# Patient Record
Sex: FEMALE | Race: BLACK OR AFRICAN AMERICAN | Employment: FULL TIME | URBAN - METROPOLITAN AREA
[De-identification: names, ages, dates, MRNs, and addresses within clinical notes are randomized per-mention and may not be internally consistent; named-entity substitution may affect disease eponyms.]

---

## 2019-11-13 ENCOUNTER — OFFICE VISIT (OUTPATIENT)
Dept: PRIMARY CARE CLINIC | Age: 29
End: 2019-11-13

## 2019-11-13 VITALS
OXYGEN SATURATION: 98 % | DIASTOLIC BLOOD PRESSURE: 61 MMHG | WEIGHT: 128 LBS | BODY MASS INDEX: 22.68 KG/M2 | HEART RATE: 70 BPM | SYSTOLIC BLOOD PRESSURE: 100 MMHG | RESPIRATION RATE: 18 BRPM | TEMPERATURE: 98.1 F | HEIGHT: 63 IN

## 2019-11-13 DIAGNOSIS — R53.83 FATIGUE, UNSPECIFIED TYPE: ICD-10-CM

## 2019-11-13 DIAGNOSIS — R59.0 LYMPHADENOPATHY, CERVICAL: Primary | ICD-10-CM

## 2019-11-13 DIAGNOSIS — L60.9 ABNORMALITY OF NAIL SURFACE: ICD-10-CM

## 2019-11-13 PROBLEM — F17.200 SMOKER: Status: ACTIVE | Noted: 2019-11-13

## 2019-11-13 NOTE — PROGRESS NOTES
Subjective:     Chief Complaint   Patient presents with    [facility]    Nail Problem     toenails getting dark    Fatigue     very tired all the time and when she does sleeps, do not sleep long    Tingling     tips of fingers x about 1 yr        She  is a 26y.o. year old female who presents as a new patient to [facility]. Reports that its been years she has seen any doctor. Patient reports that her nail been always dark but for the past few years its been more darker and feels very cold. Feet and hands always feels cold. Feels fatigue. Winter times her finger looks bluish and feels tingly. Feels slight better with warm temp. Reports that she has been noticing painful lumps in her neck on and off. Comes and goes. Denies any weight loss, chills, cough. She smokes marijuana daily to help with with sleep and her anxiety. She smokes about 10 cig/day. A comprehensive review of systems was negative except for that written in the HPI. Objective:   at  Vitals:    11/13/19 1125   BP: 100/61   Pulse: 70   Resp: 18   Temp: 98.1 °F (36.7 °C)   TempSrc: Oral   SpO2: 98%   Weight: 128 lb (58.1 kg)   Height: 5' 2.5\" (1.588 m)       Physical Examination: General appearance - alert, well appearing, and in no distress, oriented to person, place, and time and normal appearing weight  Mental status - alert, oriented to person, place, and time, normal mood, behavior, speech, dress, motor activity, and thought processes  Ears - bilateral TM's and external ear canals normal  Nose - normal and patent, no erythema, discharge or polyps  Mouth - mucous membranes moist, pharynx normal without lesions. Lips looks dark as well. Neck - thyroid exam: thyroid is normal in size without nodules or tenderness, few < 1cm posterior cervical lymph node noted in both side.   Lymphatics - no palpable lymphadenopathy otherwise noted in neck, no hepatosplenomegaly  Chest - clear to auscultation, no wheezes, rales or rhonchi, symmetric air entry  Heart - normal rate, regular rhythm, normal S1, S2, no murmurs, rubs, clicks or gallops  Abdomen - soft, nontender, nondistended, no masses or organomegaly  Neurological - alert, oriented, normal speech, no focal findings or movement disorder noted  Musculoskeletal - no joint tenderness, deformity or swelling  Extremities - peripheral pulses normal, no pedal edema, no clubbing or cyanosis. Finger nails are dark colored. Both feet toenails are very dark as well as surrounding the nail areas. No Known Allergies   Social History     Socioeconomic History    Marital status: SINGLE     Spouse name: Not on file    Number of children: Not on file    Years of education: Not on file    Highest education level: Not on file   Tobacco Use    Smoking status: Current Every Day Smoker     Packs/day: 0.50    Smokeless tobacco: Never Used   Substance and Sexual Activity    Alcohol use: No    Drug use: Yes     Types: Marijuana    Sexual activity: Never      No family history on file. Past Surgical History:   Procedure Laterality Date    HX OTHER SURGICAL      polinidal cyst removed    HX WISDOM TEETH EXTRACTION        No past medical history on file. Assessment/ Plan:   Diagnoses and all orders for this visit:    1. Lymphadenopathy, cervical  -     CT NECK SOFT TISSUE W CONT; Future               On and off lymphadenopathy for few years. No signs of active infection. Will check chest xray, STI , CMP, SLE, Rh in next visit. 2. Fatigue, unspecified type  -     THYROID CASCADE PROFILE  -     CBC WITH AUTOMATED DIFF    3. Abnormality of nail surface  -    D/D is PVD, Reilly phenomenon   DUPLEX LOWER EXT ARTERY BILAT; Future  -     REFERRAL TO VASCULAR SURGERY       Strongly encouraged patient to quit smoking cigarettes and marijuana. Medication risks/benefits/costs/interactions/alternatives discussed with patient.   Advised patient to call back or return to office if symptoms worsen/change/persist. If patient cannot reach us or should anything more severe/urgent arise he/she should proceed directly to the nearest emergency department. Discussed expected course/resolution/complications of diagnosis in detail with patient. Patient given a written after visit summary which includes her diagnoses, current medications and vitals. Patient expressed understanding with the diagnosis and plan. Follow-up and Dispositions    · Return in about 1 week (around 11/20/2019), or if symptoms worsen or fail to improve, for complete physical  and fasting blood work, discuss the findings. Ciarra Brown

## 2019-11-14 LAB
BASOPHILS # BLD AUTO: 0.1 X10E3/UL (ref 0–0.2)
BASOPHILS NFR BLD AUTO: 1 %
EOSINOPHIL # BLD AUTO: 0.1 X10E3/UL (ref 0–0.4)
EOSINOPHIL NFR BLD AUTO: 1 %
ERYTHROCYTE [DISTWIDTH] IN BLOOD BY AUTOMATED COUNT: 12.5 % (ref 12.3–15.4)
HCT VFR BLD AUTO: 36.9 % (ref 34–46.6)
HGB BLD-MCNC: 12.4 G/DL (ref 11.1–15.9)
IMM GRANULOCYTES # BLD AUTO: 0 X10E3/UL (ref 0–0.1)
IMM GRANULOCYTES NFR BLD AUTO: 0 %
LYMPHOCYTES # BLD AUTO: 2.4 X10E3/UL (ref 0.7–3.1)
LYMPHOCYTES NFR BLD AUTO: 33 %
MCH RBC QN AUTO: 31.6 PG (ref 26.6–33)
MCHC RBC AUTO-ENTMCNC: 33.6 G/DL (ref 31.5–35.7)
MCV RBC AUTO: 94 FL (ref 79–97)
MONOCYTES # BLD AUTO: 0.5 X10E3/UL (ref 0.1–0.9)
MONOCYTES NFR BLD AUTO: 7 %
NEUTROPHILS # BLD AUTO: 4.4 X10E3/UL (ref 1.4–7)
NEUTROPHILS NFR BLD AUTO: 58 %
PLATELET # BLD AUTO: 277 X10E3/UL (ref 150–450)
RBC # BLD AUTO: 3.93 X10E6/UL (ref 3.77–5.28)
TSH SERPL DL<=0.005 MIU/L-ACNC: 0.73 UIU/ML (ref 0.45–4.5)
WBC # BLD AUTO: 7.5 X10E3/UL (ref 3.4–10.8)

## 2019-11-25 ENCOUNTER — OFFICE VISIT (OUTPATIENT)
Dept: PRIMARY CARE CLINIC | Age: 29
End: 2019-11-25

## 2019-11-25 ENCOUNTER — HOSPITAL ENCOUNTER (OUTPATIENT)
Dept: VASCULAR SURGERY | Age: 29
Discharge: HOME | End: 2019-11-25
Attending: FAMILY MEDICINE
Payer: MEDICAID

## 2019-11-25 ENCOUNTER — HOSPITAL ENCOUNTER (OUTPATIENT)
Dept: CT IMAGING | Age: 29
Discharge: HOME | End: 2019-11-25
Attending: FAMILY MEDICINE
Payer: MEDICAID

## 2019-11-25 VITALS
DIASTOLIC BLOOD PRESSURE: 54 MMHG | BODY MASS INDEX: 23.53 KG/M2 | HEART RATE: 81 BPM | RESPIRATION RATE: 17 BRPM | TEMPERATURE: 98.5 F | HEIGHT: 63 IN | SYSTOLIC BLOOD PRESSURE: 92 MMHG | OXYGEN SATURATION: 99 % | WEIGHT: 132.8 LBS

## 2019-11-25 DIAGNOSIS — R59.0 LYMPHADENOPATHY, CERVICAL: ICD-10-CM

## 2019-11-25 DIAGNOSIS — R59.0 LYMPHADENOPATHY, CERVICAL: Primary | ICD-10-CM

## 2019-11-25 DIAGNOSIS — L60.9 ABNORMALITY OF NAIL SURFACE: ICD-10-CM

## 2019-11-25 DIAGNOSIS — I73.00 RAYNAUD'S PHENOMENON WITHOUT GANGRENE: ICD-10-CM

## 2019-11-25 DIAGNOSIS — Z30.09 BIRTH CONTROL COUNSELING: ICD-10-CM

## 2019-11-25 DIAGNOSIS — R53.83 FATIGUE, UNSPECIFIED TYPE: ICD-10-CM

## 2019-11-25 LAB
LEFT ABI: 1.28
LEFT ANTERIOR TIBIAL: 109 MMHG
LEFT ARM BP: 93 MMHG
LEFT POSTERIOR TIBIAL: 119 MMHG
LEFT TBI: 1.02
LEFT TOE PRESSURE: 95 MMHG
RIGHT ABI: 1.39
RIGHT ANTERIOR TIBIAL: 114 MMHG
RIGHT ARM BP: 88 MMHG
RIGHT POSTERIOR TIBIAL: 129 MMHG
RIGHT TBI: 1.18
RIGHT TOE PRESSURE: 110 MMHG

## 2019-11-25 PROCEDURE — 74011636320 HC RX REV CODE- 636/320: Performed by: RADIOLOGY

## 2019-11-25 PROCEDURE — 70491 CT SOFT TISSUE NECK W/DYE: CPT

## 2019-11-25 PROCEDURE — 93922 UPR/L XTREMITY ART 2 LEVELS: CPT

## 2019-11-25 RX ADMIN — IOPAMIDOL 100 ML: 612 INJECTION, SOLUTION INTRAVENOUS at 09:04

## 2019-11-25 NOTE — PROGRESS NOTES
Jag Bennett is a 26 y.o. female    Chief Complaint   Patient presents with    Follow-up     Since last visit, went to the vascular surgeon who ordered CT and nakle brachial index       1. Have you been to the ER, urgent care clinic since your last visit? Hospitalized since your last visit? No    2. Have you seen or consulted any other health care providers outside of the [address] [place] [address] since your last visit? Include any pap smears or colon screening. No    No flowsheet data found.      Health Maintenance Due   Topic Date Due    Pneumococcal 0-64 years (1 of 1 - PPSV23) 05/23/1995    DTaP/Tdap/Td series (1 - Tdap) 05/23/2000

## 2019-11-25 NOTE — PROGRESS NOTES
Written by Lazaro Ibanez, as dictated by Dr. Frantz Stringer MD.    Slava Mendez is a 26 y.o. female. HPI  The patient presents today for follow-up. She is feeling better today as compared to her previous visit, though she is fatigued due to working over the weekend. She reports joint pain, especially in her hands. She does sweat at night and feels hot at night, but admits she also uses 2 blankets at night as her room is cold. Ankle Brachial Index showed \"No evidence of significant peripheral arterial disease at rest in the right leg. No evidence of significant peripheral arterial disease at rest in the left leg. The right ankle/brachial index is 1.39 and the left ankle/brachial index is 1.28. The right toe/brachial index is 1.18 and the left toe/brachial index is 1.02.\" She is wearing thicker socks to keep her feet warm. She saw the Vascular Surgeon on 11/18/19, and was told it might be due to KIARA Yen. She had her CT NECK this morning. She reports no issues with neck at this time. Her last pap smear was done in 2017. She would like a referral for an OB/GYN as she would like to start a birth control that works for her. Patient Active Problem List   Diagnosis Code    Smoker F17.200        No current outpatient medications on file prior to visit. No current facility-administered medications on file prior to visit. No Known Allergies    History reviewed. No pertinent past medical history.     Past Surgical History:   Procedure Laterality Date    HX OTHER SURGICAL      polinidal cyst removed    HX WISDOM TEETH EXTRACTION         Family History   Problem Relation Age of Onset    No Known Problems Father     No Known Problems Mother        Social History     Socioeconomic History    Marital status: SINGLE     Spouse name: Not on file    Number of children: Not on file    Years of education: Not on file    Highest education level: Not on file Occupational History    Not on file   Social Needs    Financial resource strain: Not on file    Food insecurity:     Worry: Not on file     Inability: Not on file    Transportation needs:     Medical: Not on file     Non-medical: Not on file   Tobacco Use    Smoking status: Current Every Day Smoker     Packs/day: 0.50    Smokeless tobacco: Never Used   Substance and Sexual Activity    Alcohol use: No    Drug use: Yes     Types: Marijuana    Sexual activity: Yes     Partners: Male     Birth control/protection: Condom   Lifestyle    Physical activity:     Days per week: Not on file     Minutes per session: Not on file    Stress: Not on file   Relationships    Social connections:     Talks on phone: Not on file     Gets together: Not on file     Attends [employer] service: Not on file     Active member of club or organization: Not on file     Attends meetings of clubs or organizations: Not on file     Relationship status: Not on file    Intimate partner violence:     Fear of current or ex partner: Not on file     Emotionally abused: Not on file     Physically abused: Not on file     Forced sexual activity: Not on file   Other Topics Concern    Not on file   Social History Narrative    Not on file       Hospital Outpatient Visit on 11/25/2019   Component Date Value Ref Range Status    Left arm BP 11/25/2019 93  mmHg Final    Right arm BP 11/25/2019 88  mmHg Final    Left posterior tibial 11/25/2019 119  mmHg Final    Right posterior tibial 11/25/2019 129  mmHg Final    Left anterior tibial 11/25/2019 109  mmHg Final    Right anterior tibial 11/25/2019 114  mmHg Final    Left DIMAS 11/25/2019 1.28   Final    Right DIMAS 11/25/2019 1.39   Final    Left toe pressure 11/25/2019 95  mmHg Final    Right toe pressure 11/25/2019 110  mmHg Final    Left TBI 11/25/2019 1.02   Final    Right TBI 11/25/2019 1.18   Final   Office Visit on 11/13/2019   Component Date Value Ref Range Status    TSH 11/13/2019 0.735  0.450 - 4.500 uIU/mL Final    WBC 11/13/2019 7.5  3.4 - 10.8 x10E3/uL Final    RBC 11/13/2019 3.93  3.77 - 5.28 x10E6/uL Final    HGB 11/13/2019 12.4  11.1 - 15.9 g/dL Final    HCT 11/13/2019 36.9  34.0 - 46.6 % Final    MCV 11/13/2019 94  79 - 97 fL Final    MCH 11/13/2019 31.6  26.6 - 33.0 pg Final    MCHC 11/13/2019 33.6  31.5 - 35.7 g/dL Final    RDW 11/13/2019 12.5  12.3 - 15.4 % Final    PLATELET 14/22/2227 493  150 - 450 x10E3/uL Final    NEUTROPHILS 11/13/2019 58  Not Estab. % Final    Lymphocytes 11/13/2019 33  Not Estab. % Final    MONOCYTES 11/13/2019 7  Not Estab. % Final    EOSINOPHILS 11/13/2019 1  Not Estab. % Final    BASOPHILS 11/13/2019 1  Not Estab. % Final    ABS. NEUTROPHILS 11/13/2019 4.4  1.4 - 7.0 x10E3/uL Final    Abs Lymphocytes 11/13/2019 2.4  0.7 - 3.1 x10E3/uL Final    ABS. MONOCYTES 11/13/2019 0.5  0.1 - 0.9 x10E3/uL Final    ABS. EOSINOPHILS 11/13/2019 0.1  0.0 - 0.4 x10E3/uL Final    ABS. BASOPHILS 11/13/2019 0.1  0.0 - 0.2 x10E3/uL Final    IMMATURE GRANULOCYTES 11/13/2019 0  Not Estab. % Final    ABS. IMM. GRANS. 11/13/2019 0.0  0.0 - 0.1 x10E3/uL Final       Review of Systems   Constitutional: Negative for malaise/fatigue and weight loss. HENT: Negative for congestion and hearing loss. Eyes: Negative for blurred vision and photophobia. Respiratory: Negative for cough and shortness of breath. Cardiovascular: Negative for chest pain and leg swelling. Gastrointestinal: Negative for constipation, diarrhea and heartburn. Genitourinary: Negative for dysuria, frequency and urgency. Musculoskeletal: Negative for joint pain and myalgias. Neurological: Negative for dizziness and headaches. Psychiatric/Behavioral: Negative for depression and substance abuse. The patient is not nervous/anxious and does not have insomnia.       Visit Vitals  BP 92/54 (BP 1 Location: Left arm, BP Patient Position: Sitting)   Pulse 81   Temp 98.5 °F (36.9 °C) (Oral) Resp 17   Ht 5' 2.5\" (1.588 m)   Wt 132 lb 12.8 oz (60.2 kg)   LMP 11/01/2019   SpO2 99%   BMI 23.90 kg/m²       Physical Exam  Vitals signs and nursing note reviewed. Constitutional:       General: She is not in acute distress. Appearance: She is well-developed. She is not diaphoretic. HENT:      Head: Normocephalic and atraumatic. Right Ear: External ear normal.      Left Ear: External ear normal.   Eyes:      General:         Right eye: No discharge. Left eye: No discharge. Conjunctiva/sclera: Conjunctivae normal.      Pupils: Pupils are equal, round, and reactive to light. Neck:      Musculoskeletal: Normal range of motion and neck supple. Cardiovascular:      Rate and Rhythm: Normal rate and regular rhythm. Heart sounds: Normal heart sounds. No murmur. No friction rub. No gallop. Pulmonary:      Effort: Pulmonary effort is normal.      Breath sounds: Normal breath sounds. No wheezing. Abdominal:      General: Bowel sounds are normal.      Palpations: Abdomen is soft. Tenderness: There is no tenderness. Musculoskeletal: Normal range of motion. Right hand: She exhibits no swelling. Left hand: She exhibits no swelling. Lymphadenopathy:      Cervical: Cervical adenopathy present. Right cervical: Posterior cervical adenopathy (2-3 mm, soft) present. Left cervical: Posterior cervical adenopathy (1-2 mm, soft) present. Skin:     Capillary Refill: Capillary refill takes less than 2 seconds. Comments: + BL fingernails dark in color   Neurological:      Mental Status: She is alert and oriented to person, place, and time. Deep Tendon Reflexes: Reflexes are normal and symmetric. Psychiatric:         Behavior: Behavior normal.         Thought Content: Thought content normal.         ASSESSMENT and PLAN  Diagnoses and all orders for this visit:    1.  Lymphadenopathy, cervical  -     METABOLIC PANEL, COMPREHENSIVE  -     LUPUS PROFILE  - RHEUMATOID FACTOR, QL  -     C REACTIVE PROTEIN, QT  -     SED RATE (ESR)              CT neck came back normal.   2. Fatigue, unspecified type  -     METABOLIC PANEL, COMPREHENSIVE  -     LUPUS PROFILE  -     RHEUMATOID FACTOR, QL  -     C REACTIVE PROTEIN, QT  -     SED RATE (ESR)    3. Birth control counseling  -     REFERRAL TO OBSTETRICS AND GYNECOLOGY    4. Raynaud's phenomenon without gangrene       - DIMAS is normal.       - seen by vascular specialist and she was told that she has Raynauds. -   continue current management. -  Will consider sending her to rheumatologist.          Discussed pt should return for a physical examination. This plan was reviewed with the patient and patient agrees. All questions were answered. This scribe documentation was reviewed by me and accurately reflects the examination and decisions made by me. This note will not be viewable in [address] Follow-up and Dispositions    · Return if symptoms worsen or fail to improve, for complete physical  and fasting blood work. Madan Don

## 2019-11-26 ENCOUNTER — TELEPHONE (OUTPATIENT)
Dept: PRIMARY CARE CLINIC | Age: 29
End: 2019-11-26

## 2019-11-26 LAB
ALBUMIN SERPL-MCNC: 4.5 G/DL (ref 3.5–5.5)
ALBUMIN/GLOB SERPL: 2 {RATIO} (ref 1.2–2.2)
ALP SERPL-CCNC: 38 IU/L (ref 39–117)
ALT SERPL-CCNC: 8 IU/L (ref 0–32)
ANA SER QL: NEGATIVE
AST SERPL-CCNC: 11 IU/L (ref 0–40)
BILIRUB SERPL-MCNC: 0.5 MG/DL (ref 0–1.2)
BUN SERPL-MCNC: 14 MG/DL (ref 6–20)
BUN/CREAT SERPL: 21 (ref 9–23)
CALCIUM SERPL-MCNC: 8.7 MG/DL (ref 8.7–10.2)
CHLORIDE SERPL-SCNC: 103 MMOL/L (ref 96–106)
CO2 SERPL-SCNC: 23 MMOL/L (ref 20–29)
CREAT SERPL-MCNC: 0.67 MG/DL (ref 0.57–1)
CRP SERPL-MCNC: 1 MG/L (ref 0–10)
DSDNA AB SER-ACNC: 1 IU/ML (ref 0–9)
ENA RNP AB SER-ACNC: <0.2 AI (ref 0–0.9)
ENA SM AB SER-ACNC: <0.2 AI (ref 0–0.9)
ERYTHROCYTE [SEDIMENTATION RATE] IN BLOOD BY WESTERGREN METHOD: 5 MM/HR (ref 0–32)
GLOBULIN SER CALC-MCNC: 2.2 G/DL (ref 1.5–4.5)
GLUCOSE SERPL-MCNC: 81 MG/DL (ref 65–99)
POTASSIUM SERPL-SCNC: 4.1 MMOL/L (ref 3.5–5.2)
PROT SERPL-MCNC: 6.7 G/DL (ref 6–8.5)
RHEUMATOID FACT SERPL-ACNC: <10 IU/ML (ref 0–13.9)
SODIUM SERPL-SCNC: 139 MMOL/L (ref 134–144)

## 2019-11-26 NOTE — TELEPHONE ENCOUNTER
----- Message from Vanesa Villanueva MD sent at 11/26/2019  9:10 AM EST -----  Please call patient; There are scattered normal size lymph nodes in her  neck in the deep subcutaneous tissue. No acute finding.   Thyroid is normal.  Over all CT looks normal.

## 2019-11-26 NOTE — PROGRESS NOTES
Please call patient; There are scattered normal size lymph nodes in her  neck in the deep subcutaneous tissue. No acute finding.   Thyroid is normal.  Over all CT looks normal.

## 2019-11-26 NOTE — PROGRESS NOTES
Please mail letter;    1. Lupus, rheumatoid level is normal.     2. Inflammatory markers are in normal range.      3. Kidney and liver function is normal.

## 2019-12-05 ENCOUNTER — TELEPHONE (OUTPATIENT)
Dept: PRIMARY CARE CLINIC | Age: 29
End: 2019-12-05

## 2019-12-05 NOTE — TELEPHONE ENCOUNTER
----- Message from Carlos Enrique Moura sent at 12/4/2019  4:13 PM EST -----  Regarding: Dr. Farah Farwell: [phone]  Pt requesting a return call regarding lab test results from 11/25/19.

## 2019-12-05 NOTE — TELEPHONE ENCOUNTER
Informed pt that labs were normal:  1. Lupus, rheumatoid level is normal.     2. Inflammatory markers are in normal range. 3. Kidney and liver function is normal. Pt verbalized her understanding. Result letter mailed 11/26/19.

## 2019-12-17 ENCOUNTER — OFFICE VISIT (OUTPATIENT)
Dept: OBGYN CLINIC | Age: 29
End: 2019-12-17

## 2019-12-17 VITALS
BODY MASS INDEX: 23.74 KG/M2 | WEIGHT: 134 LBS | DIASTOLIC BLOOD PRESSURE: 72 MMHG | SYSTOLIC BLOOD PRESSURE: 114 MMHG | HEIGHT: 63 IN

## 2019-12-17 DIAGNOSIS — N94.6 DYSMENORRHEA: Primary | ICD-10-CM

## 2019-12-17 DIAGNOSIS — Z87.42 HISTORY OF HEAVY PERIODS: ICD-10-CM

## 2019-12-17 NOTE — PROGRESS NOTES
Gyn evaluation/followup    The patient is a 26 y.o., No obstetric history on file. Patient's last menstrual period was 11/28/2019 (exact date). .     She is here for contraceptive counseling/folllow up. Her current method of family planning is condoms always. The patient is sexually active. She states she is not satisfied with her current form of contraception because: effectivness   These concerns are mild    Contraceptive History: has used other contraception in the past: OCP's, depo, and nuvaring. She states she did not do well on any of those. Due to weight gain and she does not like how they make her feel. Her current menstrual difficulty history: heavy cycles with significant dysmenorrhea. The dysmenorrhea is sometimes alleviated by non-steroidal anti-inflammatory medication. This pain begins at the onset of menses and lasts for several days. Her previous menses she describes as moderate lasting up to 6 days. History reviewed. No pertinent past medical history.      Past Surgical History:   Procedure Laterality Date    HX OTHER SURGICAL      polinidal cyst removed    HX WISDOM TEETH EXTRACTION       Social History     Occupational History    Not on file   Tobacco Use    Smoking status: Current Every Day Smoker     Packs/day: 0.50    Smokeless tobacco: Never Used   Substance and Sexual Activity    Alcohol use: No    Drug use: Yes     Types: Marijuana    Sexual activity: Yes     Partners: Male     Birth control/protection: Condom     Family History   Problem Relation Age of Onset    No Known Problems Father     No Known Problems Mother        No Known Allergies  Prior to Admission medications    Not on File        Review of Systems - History obtained from the patient  Constitutional: negative for weight loss, fever, night sweats  HEENT: negative for hearing loss, earache, congestion, snoring, sorethroat  CV: negative for chest pain, palpitations, edema  Resp: negative for cough, shortness of breath, wheezing  Breast: negative for breast lumps, nipple discharge, galactorrhea  GI: negative for change in bowel habits, abdominal pain, black or bloody stools  : negative for frequency, dysuria, hematuria  MSK: negative for back pain, joint pain, muscle pain  Skin: negative for itching, rash, hives  Neuro: negative for dizziness, headache, confusion, weakness  Psych: negative for anxiety, depression, change in mood  Heme/lymph: negative for bleeding, bruising, pallor      Objective:    Visit Vitals  /72   Ht 5' 2.5\" (1.588 m)   Wt 134 lb (60.8 kg)   LMP 11/28/2019 (Exact Date)   BMI 24.12 kg/m²          PHYSICAL EXAMINATION    Constitutional  · Appearance: well-nourished, well developed, alert, in no acute distress    HENT  · Head and Face: appears normal    Neck  · Inspection/Palpation: normal appearance, no masses or tenderness  · Lymph Nodes: no lymphadenopathy present  · Thyroid: gland size normal, nontender, no nodules or masses present on palpation    Gastrointestinal  · Abdominal Examination: abdomen non-tender to palpation, normal bowel sounds, no masses present  · Liver and spleen: no hepatomegaly present, spleen not palpable  · Hernias: no hernias identified    Skin  · General Inspection: no rash, no lesions identified    Neurologic/Psychiatric  · Mental Status:  · Orientation: grossly oriented to person, place and time  · Mood and Affect: mood normal, affect appropriate    Assessment/Plan:   Heavy cycles and dysmenorrhea, has tried multiple other contraceptives- will rto for IUD placment      Instructions given to pt. Handouts given to pt.

## 2019-12-31 ENCOUNTER — OFFICE VISIT (OUTPATIENT)
Dept: OBGYN CLINIC | Age: 29
End: 2019-12-31

## 2019-12-31 VITALS — BODY MASS INDEX: 23.94 KG/M2 | SYSTOLIC BLOOD PRESSURE: 110 MMHG | DIASTOLIC BLOOD PRESSURE: 60 MMHG | WEIGHT: 133 LBS

## 2019-12-31 DIAGNOSIS — N94.89 SUPPRESSION OF MENSES: ICD-10-CM

## 2019-12-31 DIAGNOSIS — Z87.42 HISTORY OF HEAVY PERIODS: ICD-10-CM

## 2019-12-31 DIAGNOSIS — N94.6 DYSMENORRHEA: ICD-10-CM

## 2019-12-31 DIAGNOSIS — Z30.430 ENCOUNTER FOR IUD INSERTION: Primary | ICD-10-CM

## 2019-12-31 LAB
HCG URINE, QL. (POC): NEGATIVE
VALID INTERNAL CONTROL?: YES

## 2019-12-31 NOTE — PROGRESS NOTES
[facility]  OFFICE PROCEDURE PROGRESS NOTE        Chart reviewed for the following:   ICali RN, have reviewed the History, Physical and updated the Allergic reactions for Janes Willett performed immediately prior to start of procedure:   Gardenia Sow RN, have performed the following reviews on Fabrice Corbin prior to the start of the procedure:            * Patient was identified by name and date of birth   * Agreement on procedure being performed was verified  * Risks and Benefits explained to the patient  * Procedure site verified and marked as necessary  * Patient was positioned for comfort  * Consent was signed and verified     Time: 09      Date of procedure: 2019    Procedure performed by: Nadia Jamison MD    Provider assisted by: Lisa Kirkland RN    Patient assisted by: self    How tolerated by patient: tolerated the procedure well with no complications    Post Procedural Pain Scale: 2 - Hurts Little Bit    Comments: none                  -----------------------------------IUD INSERTION----------------------------------------- Indications:  Fabrice Corbin is a ,  26 y.o. female [address] whose No LMP recorded. was on  and was normal in duration and amount of flow. who presents for insertion of an IUD. The risks, benefits and alternatives of IUD insertion were discussed in detail at last visit. She also has reviewed Mirena information. She has elected to proceed with the insertion today and she states she has no further questions. A urine pregnancy test was negative No components found for: SPEP, UPEP    Procedure: The pelvic exam revealed normal external genitalia. On bimanual exam the uterus was anteverted and normal in size with no tenderness present. A speculum was inserted into the vagina and the cervix was visualized. The cervix was prepped with zephiran solution.  The posterior lip of the cervix was grasped with a single toothed tenaculum. The uterus was sounded with a Samaniego sound to 7 centimeters. A Mirena was then inserted without difficulty. The string was cut to 3 centimeters. She experienced a mild  amount of cramping. Post Procedure Status: She tolerated the procedure with mild discomfort. The patient was observed for 10 minutes after the insertion. There were no complications. Patient was discharged in stable condition. The patient received Mirena lot number [id number].

## 2020-01-29 ENCOUNTER — TELEPHONE (OUTPATIENT)
Dept: OBGYN CLINIC | Age: 30
End: 2020-01-29

## 2020-01-29 NOTE — TELEPHONE ENCOUNTER
Dr. Alessandra Dowell did a IUD placement for you while you were out for someone with history of heavy cycles. She had a Mirena placed on 12/31/19. She is scheduled for IUD check WITHOUT  Ultrasound on 2/11/20 with you. She states that she was told to expect some spotting for several weeks. She said that she did have spotting and then it worsened over the past 3 weeks. Had pelvic/hip pain last week that was making it hard to walk, that has since improved. She is now bleeding heavier x 3 weeks. She is changing an overnight pad twice daily with bright red blood and small clots size of erasers. No cramping. Fatigued. I know bleeding can be expected for up to 6 months. She and I discussed ibuprofen protocol 800 mg q8hrs for 72 hours to help with inflammation, pain, and bleeding. Heating pad on and off. Discussed bleeding and pain precautions.     Anything else that you would like to add?

## 2020-01-29 NOTE — TELEPHONE ENCOUNTER
The day she was scheduled for FW does not have US. Rescheduled for 2/4/20 at 930 am for ultrasound and visit with FW (with wait-pt aware) at 1050. Arrive for ultrasound at 9:15 am.  Patient is good with this date and time.

## 2020-11-23 ENCOUNTER — HOSPITAL ENCOUNTER (EMERGENCY)
Age: 30
Discharge: HOME | End: 2020-11-23
Attending: EMERGENCY MEDICINE
Payer: COMMERCIAL

## 2020-11-23 VITALS
SYSTOLIC BLOOD PRESSURE: 123 MMHG | OXYGEN SATURATION: 99 % | HEART RATE: 67 BPM | BODY MASS INDEX: 23 KG/M2 | DIASTOLIC BLOOD PRESSURE: 74 MMHG | TEMPERATURE: 98.9 F | HEIGHT: 62 IN | RESPIRATION RATE: 16 BRPM | WEIGHT: 125 LBS

## 2020-11-23 DIAGNOSIS — R09.89 GLOBUS SENSATION: Primary | ICD-10-CM

## 2020-11-23 PROCEDURE — 99284 EMERGENCY DEPT VISIT MOD MDM: CPT

## 2020-11-23 PROCEDURE — 74011250637 HC RX REV CODE- 250/637: Performed by: EMERGENCY MEDICINE

## 2020-11-23 PROCEDURE — 74011000250 HC RX REV CODE- 250: Performed by: EMERGENCY MEDICINE

## 2020-11-23 RX ORDER — MAG HYDROX/ALUMINUM HYD/SIMETH 200-200-20
30 SUSPENSION, ORAL (FINAL DOSE FORM) ORAL
Status: DISCONTINUED | OUTPATIENT
Start: 2020-11-23 | End: 2020-11-23 | Stop reason: HOSPADM

## 2020-11-23 RX ORDER — LIDOCAINE HYDROCHLORIDE 20 MG/ML
15 SOLUTION OROPHARYNGEAL
Status: COMPLETED | OUTPATIENT
Start: 2020-11-23 | End: 2020-11-23

## 2020-11-23 RX ADMIN — ALUMINUM HYDROXIDE, MAGNESIUM HYDROXIDE, AND SIMETHICONE 30 ML: 200; 200; 20 SUSPENSION ORAL at 04:07

## 2020-11-23 RX ADMIN — LIDOCAINE HYDROCHLORIDE 15 ML: 20 SOLUTION ORAL; TOPICAL at 04:07

## 2020-11-23 NOTE — ED PROVIDER NOTES
EMERGENCY DEPARTMENT HISTORY AND PHYSICAL EXAM      Date: 11/23/2020  Patient Name: Conley Sandhoff    History of Presenting Illness     Chief Complaint   Patient presents with    Foreign Body Swallowed       History Provided By: Patient    HPI: Conley Sandhoff, 31 y.o. female with a past medical history significant No significant past medical history presents to the ED with cc of stuck in her throat. Patient was taking a pain pill for a dental infection and states she burped and solid swallowed and felt as if the pill got stuck in her throat. It does feel like it is moved up a little further down. She has improving but still feels a \"odd\" sensation in her throat. No shortness of breath no chest pain no cough. No difficulty swallowing. She is tolerating p.o. in the ED. There are no other complaints, changes, or physical findings at this time. PCP: Ankush Hubbard MD    No current facility-administered medications on file prior to encounter. No current outpatient medications on file prior to encounter. Past History     Past Medical History:  Past Medical History:   Diagnosis Date    Encounter for insertion of mirena IUD 12/31/2019       Past Surgical History:  Past Surgical History:   Procedure Laterality Date    HX OTHER SURGICAL      polinidal cyst removed    HX WISDOM TEETH EXTRACTION         Family History:  Family History   Problem Relation Age of Onset    No Known Problems Father     No Known Problems Mother        Social History:  Social History     Tobacco Use    Smoking status: Current Every Day Smoker     Packs/day: 0.50    Smokeless tobacco: Never Used   Substance Use Topics    Alcohol use: No    Drug use: Yes     Types: Marijuana       Allergies:  No Known Allergies      Review of Systems     Review of Systems   Constitutional: Negative for activity change and appetite change. HENT: Negative for drooling, mouth sores, sore throat, trouble swallowing and voice change. Respiratory: Negative for chest tightness and shortness of breath. Cardiovascular: Negative for chest pain and palpitations. Gastrointestinal: Negative for abdominal pain and nausea. Physical Exam     Physical Exam  Vitals signs and nursing note reviewed. Constitutional:       Appearance: Normal appearance. She is normal weight. HENT:      Head: Normocephalic. Right Ear: External ear normal.      Left Ear: External ear normal.      Nose: Nose normal.      Mouth/Throat:      Mouth: Mucous membranes are moist.      Pharynx: Oropharynx is clear. Neck:      Musculoskeletal: Normal range of motion. No neck rigidity. Comments: No crepitance  Neurological:      Mental Status: She is alert. Medical Decision Making   - I am the first provider for this patient. - I reviewed the vital signs, available nursing notes, past medical history, past surgical history, family history and social history. - Initial assessment performed. The patients presenting problems have been discussed, and they are in agreement with the care plan formulated and outlined with them. I have encouraged them to ask questions as they arise throughout their visit. Vital Signs-Reviewed the patient's vital signs. Patient Vitals for the past 12 hrs:   Temp Pulse Resp BP SpO2   11/23/20 0518 -- 67 16 123/74 99 %   11/23/20 0436 -- 64 17 115/73 99 %   11/23/20 0343 -- 88 16 104/72 99 %   11/23/20 0333 -- -- -- -- 99 %   11/23/20 0325 98.9 °F (37.2 °C) 77 18 118/80 99 %       Records Reviewed: Nursing Notes    ED Course:          Provider Notes (Medical Decision Making):   69-year-old female presenting with what is likely a globus sensation. She is tolerating p.o. she is drug can of [employer] she is given a GI cocktail. She reports symptoms have essentially resolved. Discharge home with expectant management. No concerns for esophageal rupture or impacted foreign body.   MDM           Disposition   Disposition: Discharged    DISCHARGE PLAN:  1. Cannot display discharge medications since this patient is not currently admitted. 2.   Follow-up Information     Follow up With Specialties Details Why [facility] EMERGENCY DEPT Emergency Medicine Go to  As needed, or for any concerns or deteriorations. , if symptoms persist or worsen. [address] [facility] [address] [phone]    Cinthya Blackwell MD Internal Medicine  As needed [facility]  [address]  [address] [facility]  [phone]          3. Return to ED if worse   4. There are no discharge medications for this patient. Diagnosis     Clinical Impression:   1. Globus sensation        Attestations:    Jn Berger MD    Please note that this dictation was completed with [employer], the computer voice recognition software. Quite often unanticipated grammatical, syntax, homophones, and other interpretive errors are inadvertently transcribed by the computer software. Please disregard these errors. Please excuse any errors that have escaped final proofreading. Thank you.

## 2020-11-23 NOTE — ED TRIAGE NOTES
Taking tylenol for pain and went to swallow pill and belched and states the pill got \"stuck\", coughed and vomited but never saw the pill. States she can still feel the pill when she swallows.

## 2021-04-15 NOTE — PROGRESS NOTES
Diagnosis: Chronic bilateral low back pain without sciatica (M54.5,G89.29)  Spondylosis of lumbosacral spine without myelopathy (M47.817)  Lumbar strain, subsequent encounter (Y51.328H        Next MD visit: none scheduled  Fall Risk: standard         Pre Result discussed in office today. independent with HEP, its progression, and management of residual symptoms. 2. Patient will report neck and back pain of not more than 1/10 during activity. 3. Increase cervical and lumbar spine ROM to [facility] into all planes to improve mobility.   4. Increas

## 2022-09-28 ENCOUNTER — HOSPITAL ENCOUNTER (EMERGENCY)
Age: 32
Discharge: HOME | End: 2022-09-28
Attending: EMERGENCY MEDICINE
Payer: MEDICAID

## 2022-09-28 VITALS
TEMPERATURE: 98.2 F | RESPIRATION RATE: 16 BRPM | HEART RATE: 75 BPM | OXYGEN SATURATION: 96 % | DIASTOLIC BLOOD PRESSURE: 72 MMHG | SYSTOLIC BLOOD PRESSURE: 113 MMHG

## 2022-09-28 DIAGNOSIS — A08.4 VIRAL GASTROENTERITIS: Primary | ICD-10-CM

## 2022-09-28 PROCEDURE — 99283 EMERGENCY DEPT VISIT LOW MDM: CPT

## 2022-09-28 RX ORDER — ONDANSETRON 4 MG/1
4 TABLET, ORALLY DISINTEGRATING ORAL
Qty: 7 TABLET | Refills: 0 | Status: SHIPPED | OUTPATIENT
Start: 2022-09-28 | End: 2022-09-28 | Stop reason: SDUPTHER

## 2022-09-28 RX ORDER — ONDANSETRON 4 MG/1
4 TABLET, ORALLY DISINTEGRATING ORAL
Qty: 7 TABLET | Refills: 0 | Status: SHIPPED | OUTPATIENT
Start: 2022-09-28

## 2022-09-28 NOTE — DISCHARGE INSTRUCTIONS
You were seen in the ER for your vomiting, diarrhea, and abdominal pain. This is likely from a virus infecting your stomach and intestines and irritating it causing vomiting and diarrhea. We call this a \"viral gastroenteritis\" or \"stomach bug/flu. \" Thankfully, you are not dehydrated and we were able to control your symptoms with an antinausea medication. This medication dissolves under your tongue so you can take it even if you are actively vomiting. Take tylenol or ibuprofen for aches, pains or fever for the next few days and follow up with your primary care doctor to make sure you are getting better. Return to the ER for any new pain, uncontrollable vomiting or diarrhea or any other new or concerning symptoms. Thank you! Thank you for allowing me to care for you in the emergency department. It is my goal to provide you with excellent care. If you have not received excellent quality care, please ask to speak to the nurse manager. Please fill out the survey that will come to you by mail or email since we listen to your feedback! Below you will find a list of your tests from today's visit. Should you have any questions, please do not hesitate to call the emergency department. Labs  No results found for this or any previous visit (from the past 12 hour(s)). Radiologic Studies  No orders to display     CT Results  (Last 48 hours)      None          CXR Results  (Last 48 hours)      None          ------------------------------------------------------------------------------------------------------------  The exam and treatment you received in the Emergency Department were for an urgent problem and are not intended as complete care. It is important that you follow-up with a doctor, nurse practitioner, or physician assistant to:  (1) confirm your diagnosis,  (2) re-evaluation of changes in your illness and treatment, and  (3) for ongoing care.  Please take your discharge instructions with you when you go to your follow-up appointment. If you have any problem arranging a follow-up appointment, contact the Emergency Department. If your symptoms become worse or you do not improve as expected and you are unable to reach your health care provider, please return to the Emergency Department. We are available 24 hours a day. If a prescription has been provided, please have it filled as soon as possible to prevent a delay in treatment. If you have any questions or reservations about taking the medication due to side effects or interactions with other medications, please call your primary care provider or contact the ER.

## 2022-09-28 NOTE — ED TRIAGE NOTES
Patient presents to the ED with abd pain, N/V/D and rectal bleeding when she wiped. Reports she has hemorrhoids but none right now.

## 2022-09-28 NOTE — ED PROVIDER NOTES
EMERGENCY DEPARTMENT HISTORY AND PHYSICAL EXAM      Date: 9/28/2022  Patient Name: Emily Hair      History of Presenting Illness     Chief Complaint   Patient presents with    Abdominal Pain    Diarrhea    Vomiting    Rectal Bleeding       History Provided By: Patient    HPI: Emily Hair, 34 y.o. female with a past medical history significant for Hemorrhoids presents to the ED with cc of abdominal pain, n/v/d. Endorses tenesmus since Sunday. Noticed small amount of smear of blood when she wiped on Sunday. Yesterday having frequent watery diarrhea, noticed some blood when wiping again. Having crampy lower abd pain. No F/C. +Sick contact in son with viral URI sx that began Sunday. Today w/1 episode NBNB emesis and continued diarrhea. Has cousin with Crohn's but no other relatives with autoimmune disease. No recent abx use or travel. There are no other complaints, changes, or physical findings at this time. PCP: Conner Jonas MD    Current Outpatient Medications   Medication Sig Dispense Refill    ondansetron (ZOFRAN ODT) 4 mg disintegrating tablet Take 1 Tablet by mouth every eight (8) hours as needed for Nausea or Vomiting. 7 Tablet 0       Past History     Past Medical History:  Past Medical History:   Diagnosis Date    Encounter for insertion of mirena IUD 12/31/2019       Past Surgical History:  Past Surgical History:   Procedure Laterality Date    HX OTHER SURGICAL      polinidal cyst removed    HX WISDOM TEETH EXTRACTION         Family History:  Family History   Problem Relation Age of Onset    No Known Problems Father     No Known Problems Mother        Social History:  Social History     Tobacco Use    Smoking status: Every Day     Packs/day: 0.50     Types: Cigarettes    Smokeless tobacco: Never   Substance Use Topics    Alcohol use: No    Drug use: Yes     Types: Marijuana       Allergies:  No Known Allergies      Review of Systems   Constitutional: Negative except as in HPI.   Eyes: Negative except as in HPI.  ENT: Negative except as in HPI. Cardiovascular: Negative except as in HPI. Respiratory: Negative except as in HPI. Gastrointestinal: Negative except as in HPI. Genitourinary: Negative except as in HPI. Musculoskeletal: Negative except as in HPI. Integumentary: Negative except as in HPI. Neurological: Negative except as in HPI. Psychiatric: Negative except as in HPI. Endocrine: Negative except as in HPI. Hematologic/Lymphatic: Negative except as in HPI. Allergic/Immunologic: Negative except as in HPI. Physical Exam   Constitutional: Awake and alert, interactive, NAD  Eyes: PERRL, no injection or scleral icterus, no discharge  HEENT: NCAT, neck supple, MMM, no oropharyngeal exudates  CV: RRR, no m/r/g  Respiratory: CTAB, no r/r/w  GI: Abd soft, nondistended, ttp throughout, no r/g  : Deferred  MSK: FROM, no joint effusions or edema  Skin: No rashes  Neuro: CN2-12 intact, symmetric facies, fluent speech. Psych: Well-groomed, normal speech, behavior, appropriate mood      Lab and Diagnostic Study Results     Labs -   No results found for this or any previous visit (from the past 12 hour(s)). Radiologic Studies -   [unfilled]  CT Results  (Last 48 hours)      None          CXR Results  (Last 48 hours)      None            Medical Decision Making and ED Course   - I am the first and primary provider for this patient AND AM THE PRIMARY PROVIDER OF RECORD. - I reviewed the vital signs, available nursing notes, past medical history, past surgical history, family history and social history. - Initial assessment performed. The patients presenting problems have been discussed, and the staff are in agreement with the care plan formulated and outlined with them. I have encouraged them to ask questions as they arise throughout their visit. Vital Signs-Reviewed the patient's vital signs.     Patient Vitals for the past 12 hrs:   Temp Pulse Resp BP SpO2   09/28/22 1308 98.2 °F (36.8 °C) 75 16 113/72 96 %       EKG interpretation:         Provider Notes (Medical Decision Making):   32F w/viral gastroenteritis, abd benign, nonfocal. Minimal bleeding and in setting of likely gastroenteritis, no indication for colonoscopy at this time. Will give GI for f/up if sx not improving in next week or so for possible Crohn's/UC. Dispo home w/zofran and return precautions. ED Course:                Disposition     Disposition: DC- Adult Discharges: All of the diagnostic tests were reviewed and questions answered. Diagnosis, care plan and treatment options were discussed. The patient understands the instructions and will follow up as directed. The patients results have been reviewed with them. They have been counseled regarding their diagnosis. The patient verbally convey understanding and agreement of the signs, symptoms, diagnosis, treatment and prognosis and additionally agrees to follow up as recommended with their PCP in 24 - 48 hours. They also agree with the care-plan and convey that all of their questions have been answered. I have also put together some discharge instructions for them that include: 1) educational information regarding their diagnosis, 2) how to care for their diagnosis at home, as well a 3) list of reasons why they would want to return to the ED prior to their follow-up appointment, should their condition change. Discharged      Diagnosis     Clinical Impression:   1. Viral gastroenteritis        Attestations:     Yvonne Oswald MD

## 2022-11-13 ENCOUNTER — HOSPITAL ENCOUNTER (EMERGENCY)
Age: 32
Discharge: HOME | End: 2022-11-13
Attending: EMERGENCY MEDICINE
Payer: MEDICAID

## 2022-11-13 VITALS
HEIGHT: 62 IN | RESPIRATION RATE: 15 BRPM | HEART RATE: 47 BPM | OXYGEN SATURATION: 100 % | TEMPERATURE: 98.8 F | DIASTOLIC BLOOD PRESSURE: 78 MMHG | SYSTOLIC BLOOD PRESSURE: 120 MMHG | WEIGHT: 120 LBS | BODY MASS INDEX: 22.08 KG/M2

## 2022-11-13 DIAGNOSIS — R07.9 CHEST PAIN, UNSPECIFIED TYPE: Primary | ICD-10-CM

## 2022-11-13 DIAGNOSIS — R00.2 PALPITATIONS: ICD-10-CM

## 2022-11-13 LAB
ALBUMIN SERPL-MCNC: 3.9 G/DL (ref 3.5–5)
ALBUMIN/GLOB SERPL: 1.4 {RATIO} (ref 1.1–2.2)
ALP SERPL-CCNC: 40 U/L (ref 45–117)
ALT SERPL-CCNC: 19 U/L (ref 12–78)
ANION GAP SERPL CALC-SCNC: 8 MMOL/L (ref 5–15)
AST SERPL W P-5'-P-CCNC: 11 U/L (ref 15–37)
BASOPHILS # BLD: 0.1 K/UL (ref 0–0.1)
BASOPHILS NFR BLD: 1 % (ref 0–1)
BILIRUB SERPL-MCNC: 0.6 MG/DL (ref 0.2–1)
BUN SERPL-MCNC: 6 MG/DL (ref 6–20)
BUN/CREAT SERPL: 9 (ref 12–20)
CA-I BLD-MCNC: 8.9 MG/DL (ref 8.5–10.1)
CHLORIDE SERPL-SCNC: 110 MMOL/L (ref 97–108)
CO2 SERPL-SCNC: 24 MMOL/L (ref 21–32)
CREAT SERPL-MCNC: 0.68 MG/DL (ref 0.55–1.02)
DIFFERENTIAL METHOD BLD: ABNORMAL
EOSINOPHIL # BLD: 0.1 K/UL (ref 0–0.4)
EOSINOPHIL NFR BLD: 1 % (ref 0–7)
ERYTHROCYTE [DISTWIDTH] IN BLOOD BY AUTOMATED COUNT: 13.8 % (ref 11.5–14.5)
GLOBULIN SER CALC-MCNC: 2.8 G/DL (ref 2–4)
GLUCOSE SERPL-MCNC: 76 MG/DL (ref 65–100)
HCT VFR BLD AUTO: 35.2 % (ref 35–47)
HGB BLD-MCNC: 12.1 G/DL (ref 11.5–16)
IMM GRANULOCYTES # BLD AUTO: 0 K/UL (ref 0–0.04)
IMM GRANULOCYTES NFR BLD AUTO: 0 % (ref 0–0.5)
LYMPHOCYTES # BLD: 2.5 K/UL (ref 0.8–3.5)
LYMPHOCYTES NFR BLD: 37 % (ref 12–49)
MCH RBC QN AUTO: 32.4 PG (ref 26–34)
MCHC RBC AUTO-ENTMCNC: 34.4 G/DL (ref 30–36.5)
MCV RBC AUTO: 94.4 FL (ref 80–99)
MONOCYTES # BLD: 0.6 K/UL (ref 0–1)
MONOCYTES NFR BLD: 9 % (ref 5–13)
NEUTS SEG # BLD: 3.5 K/UL (ref 1.8–8)
NEUTS SEG NFR BLD: 52 % (ref 32–75)
NRBC # BLD: 0 K/UL (ref 0–0.01)
NRBC BLD-RTO: 0 PER 100 WBC
PLATELET # BLD AUTO: 225 K/UL (ref 150–400)
PMV BLD AUTO: 9.4 FL (ref 8.9–12.9)
POTASSIUM SERPL-SCNC: 3.3 MMOL/L (ref 3.5–5.1)
PROT SERPL-MCNC: 6.7 G/DL (ref 6.4–8.2)
RBC # BLD AUTO: 3.73 M/UL (ref 3.8–5.2)
SODIUM SERPL-SCNC: 142 MMOL/L (ref 136–145)
TROPONIN-HIGH SENSITIVITY: 5 NG/L (ref 0–51)
TROPONIN-HIGH SENSITIVITY: 6 NG/L (ref 0–51)
WBC # BLD AUTO: 6.8 K/UL (ref 3.6–11)

## 2022-11-13 PROCEDURE — 36415 COLL VENOUS BLD VENIPUNCTURE: CPT

## 2022-11-13 PROCEDURE — 93005 ELECTROCARDIOGRAM TRACING: CPT

## 2022-11-13 PROCEDURE — 84484 ASSAY OF TROPONIN QUANT: CPT

## 2022-11-13 PROCEDURE — 85025 COMPLETE CBC W/AUTO DIFF WBC: CPT

## 2022-11-13 PROCEDURE — 80053 COMPREHEN METABOLIC PANEL: CPT

## 2022-11-13 PROCEDURE — 99284 EMERGENCY DEPT VISIT MOD MDM: CPT

## 2022-11-13 NOTE — Clinical Note
[facility] EMERGENCY DEPT  [address] [place] [id number]  [phone]    Work/School Note    Date: 11/13/2022    To Whom It May concern:    Venita Donaldson was seen and treated today in the emergency room by the following provider(s):  Attending Provider: Bienvenido Diaz MD.      Venita Donaldson is excused from work/school on 11/13/22 and 11/14/22. She is medically clear to return to work/school on 11/15/2022.        Sincerely,          Kayla Grullon MD

## 2022-11-13 NOTE — Clinical Note
[facility] EMERGENCY DEPT  [address] [place] [id number]  [phone]    Work/School Note    Date: 11/13/2022    To Whom It May concern:      Grace Friedman was seen and treated today in the emergency room by the following provider(s):  Attending Provider: Teresa Curry MD.      Grace Friedman is excused from work/school on 11/13/22. She is clear to return to work/school on 11/14/22.         Sincerely,          Parminder Schilling MD

## 2022-11-13 NOTE — DISCHARGE INSTRUCTIONS
Thank you! Thank you for allowing me to care for you in the emergency department. It is my goal to provide you with excellent care. If you have not received excellent quality care, please ask to speak to the nurse manager. Please fill out the survey that will come to you by mail or email since we listen to your feedback! Below you will find a list of your tests from today's visit. Should you have any questions, please do not hesitate to call the emergency department. Labs  Recent Results (from the past 12 hour(s))   CBC WITH AUTOMATED DIFF    Collection Time: 11/13/22 12:16 PM   Result Value Ref Range    WBC 6.8 3.6 - 11.0 K/uL    RBC 3.73 (L) 3.80 - 5.20 M/uL    HGB 12.1 11.5 - 16.0 g/dL    HCT 35.2 35.0 - 47.0 %    MCV 94.4 80.0 - 99.0 FL    MCH 32.4 26.0 - 34.0 PG    MCHC 34.4 30.0 - 36.5 g/dL    RDW 13.8 11.5 - 14.5 %    PLATELET 202 569 - 532 K/uL    MPV 9.4 8.9 - 12.9 FL    NRBC 0.0 0.0  WBC    ABSOLUTE NRBC 0.00 0.00 - 0.01 K/uL    NEUTROPHILS 52 32 - 75 %    LYMPHOCYTES 37 12 - 49 %    MONOCYTES 9 5 - 13 %    EOSINOPHILS 1 0 - 7 %    BASOPHILS 1 0 - 1 %    IMMATURE GRANULOCYTES 0 0 - 0.5 %    ABS. NEUTROPHILS 3.5 1.8 - 8.0 K/UL    ABS. LYMPHOCYTES 2.5 0.8 - 3.5 K/UL    ABS. MONOCYTES 0.6 0.0 - 1.0 K/UL    ABS. EOSINOPHILS 0.1 0.0 - 0.4 K/UL    ABS. BASOPHILS 0.1 0.0 - 0.1 K/UL    ABS. IMM. GRANS. 0.0 0.00 - 0.04 K/UL    DF AUTOMATED     METABOLIC PANEL, COMPREHENSIVE    Collection Time: 11/13/22 12:16 PM   Result Value Ref Range    Sodium 142 136 - 145 mmol/L    Potassium 3.3 (L) 3.5 - 5.1 mmol/L    Chloride 110 (H) 97 - 108 mmol/L    CO2 24 21 - 32 mmol/L    Anion gap 8 5 - 15 mmol/L    Glucose 76 65 - 100 mg/dL    BUN 6 6 - 20 mg/dL    Creatinine 0.68 0.55 - 1.02 mg/dL    BUN/Creatinine ratio 9 (L) 12 - 20      eGFR >60 >60 ml/min/1.73m2    Calcium 8.9 8.5 - 10.1 mg/dL    Bilirubin, total 0.6 0.2 - 1.0 mg/dL    AST (SGOT) 11 (L) 15 - 37 U/L    ALT (SGPT) 19 12 - 78 U/L    Alk.  phosphatase 40 (L) 45 - 117 U/L    Protein, total 6.7 6.4 - 8.2 g/dL    Albumin 3.9 3.5 - 5.0 g/dL    Globulin 2.8 2.0 - 4.0 g/dL    A-G Ratio 1.4 1.1 - 2.2     TROPONIN-HIGH SENSITIVITY    Collection Time: 11/13/22 12:17 PM   Result Value Ref Range    Troponin-High Sensitivity 5 0 - 51 ng/L   TROPONIN-HIGH SENSITIVITY    Collection Time: 11/13/22  2:21 PM   Result Value Ref Range    Troponin-High Sensitivity 6 0 - 51 ng/L       Radiologic Studies  No orders to display     CT Results  (Last 48 hours)      None          CXR Results  (Last 48 hours)      None          ------------------------------------------------------------------------------------------------------------  The exam and treatment you received in the Emergency Department were for an urgent problem and are not intended as complete care. It is important that you follow-up with a doctor, nurse practitioner, or physician assistant to:  (1) confirm your diagnosis,  (2) re-evaluation of changes in your illness and treatment, and  (3) for ongoing care. Please take your discharge instructions with you when you go to your follow-up appointment. If you have any problem arranging a follow-up appointment, contact the Emergency Department. If your symptoms become worse or you do not improve as expected and you are unable to reach your health care provider, please return to the Emergency Department. We are available 24 hours a day. If a prescription has been provided, please have it filled as soon as possible to prevent a delay in treatment. If you have any questions or reservations about taking the medication due to side effects or interactions with other medications, please call your primary care provider or contact the ER.

## 2022-11-13 NOTE — ED TRIAGE NOTES
Pt. Arrives via EMS from [facility] where she was getting groceries when she had sudden onset of chest pain that felt \"fast\". Per EMS the patient's heart rate was in the 50s and the patient had multifocal PVCs.

## 2022-11-13 NOTE — ED PROVIDER NOTES
EMERGENCY DEPARTMENT HISTORY AND PHYSICAL EXAM      Date: 11/13/2022  Patient Name: Bhanu Barrientos    History of Presenting Illness     Chief Complaint   Patient presents with    Chest Pain       History Provided By: Patient    HPI: Bhanu Barrientos, 26-year-old female with no past medical history presenting with chest pain and palpitations. Patient states she was shopping at [employer] and discussing buying an Insta pot when all of a sudden she started having midsternal chest heaviness. She states she felt lightheaded and short of breath with this and it lasted about 20 minutes. She states she got anxious afterward and felt like she was hyperventilating. Symptoms resolved without intervention. She states with medics she was having palpitations and they noticed PVCs on the monitor. She had otherwise been in her usual state of health. No recent cough, fevers, congestions, nausea, vomiting. There are no other complaints, changes, or physical findings at this time. PCP: None    No current facility-administered medications on file prior to encounter. Current Outpatient Medications on File Prior to Encounter   Medication Sig Dispense Refill    ondansetron (ZOFRAN ODT) 4 mg disintegrating tablet Take 1 Tablet by mouth every eight (8) hours as needed for Nausea or Vomiting.  (Patient not taking: Reported on 11/13/2022) 7 Tablet 0       Past History     Past Medical History:  Past Medical History:   Diagnosis Date    Encounter for insertion of mirena IUD 12/31/2019       Past Surgical History:  Past Surgical History:   Procedure Laterality Date    HX OTHER SURGICAL      polinidal cyst removed    HX WISDOM TEETH EXTRACTION         Family History:  Family History   Problem Relation Age of Onset    No Known Problems Father     No Known Problems Mother        Social History:  Social History     Tobacco Use    Smoking status: Every Day     Packs/day: 0.50     Types: Cigarettes    Smokeless tobacco: Never   Substance Use Topics    Alcohol use: No    Drug use: Yes     Types: Marijuana       Allergies:  No Known Allergies    Review of Systems   Review of Systems   Constitutional:  Negative for chills and fever. HENT:  Negative for sore throat. Eyes:  Negative for redness. Respiratory:  Positive for shortness of breath. Cardiovascular:  Positive for chest pain and palpitations. Gastrointestinal:  Negative for abdominal pain, nausea and vomiting. Genitourinary:  Negative for flank pain. Musculoskeletal:  Negative for myalgias. Skin:  Negative for rash. Neurological:  Negative for headaches. Physical Exam   Physical Exam  Vitals and nursing note reviewed. Constitutional:       General: She is not in acute distress. Appearance: Normal appearance. HENT:      Head: Normocephalic and atraumatic. Mouth/Throat:      Mouth: Mucous membranes are moist.   Eyes:      Extraocular Movements: Extraocular movements intact. Conjunctiva/sclera: Conjunctivae normal.   Cardiovascular:      Rate and Rhythm: Normal rate and regular rhythm. Pulmonary:      Effort: Pulmonary effort is normal. No respiratory distress. Breath sounds: Normal breath sounds. No wheezing, rhonchi or rales. Abdominal:      General: There is no distension. Palpations: Abdomen is soft. Tenderness: There is no abdominal tenderness. Musculoskeletal:         General: Normal range of motion. Cervical back: Normal range of motion. Skin:     General: Skin is warm and dry. Neurological:      General: No focal deficit present. Mental Status: She is alert and oriented to person, place, and time. Mental status is at baseline.         Lab and Diagnostic Study Results   Labs -     Recent Results (from the past 12 hour(s))   CBC WITH AUTOMATED DIFF    Collection Time: 11/13/22 12:16 PM   Result Value Ref Range    WBC 6.8 3.6 - 11.0 K/uL    RBC 3.73 (L) 3.80 - 5.20 M/uL    HGB 12.1 11.5 - 16.0 g/dL    HCT 35.2 35.0 - 47.0 %    MCV 94.4 80.0 - 99.0 FL    MCH 32.4 26.0 - 34.0 PG    MCHC 34.4 30.0 - 36.5 g/dL    RDW 13.8 11.5 - 14.5 %    PLATELET 298 802 - 236 K/uL    MPV 9.4 8.9 - 12.9 FL    NRBC 0.0 0.0  WBC    ABSOLUTE NRBC 0.00 0.00 - 0.01 K/uL    NEUTROPHILS 52 32 - 75 %    LYMPHOCYTES 37 12 - 49 %    MONOCYTES 9 5 - 13 %    EOSINOPHILS 1 0 - 7 %    BASOPHILS 1 0 - 1 %    IMMATURE GRANULOCYTES 0 0 - 0.5 %    ABS. NEUTROPHILS 3.5 1.8 - 8.0 K/UL    ABS. LYMPHOCYTES 2.5 0.8 - 3.5 K/UL    ABS. MONOCYTES 0.6 0.0 - 1.0 K/UL    ABS. EOSINOPHILS 0.1 0.0 - 0.4 K/UL    ABS. BASOPHILS 0.1 0.0 - 0.1 K/UL    ABS. IMM. GRANS. 0.0 0.00 - 0.04 K/UL    DF AUTOMATED     METABOLIC PANEL, COMPREHENSIVE    Collection Time: 11/13/22 12:16 PM   Result Value Ref Range    Sodium 142 136 - 145 mmol/L    Potassium 3.3 (L) 3.5 - 5.1 mmol/L    Chloride 110 (H) 97 - 108 mmol/L    CO2 24 21 - 32 mmol/L    Anion gap 8 5 - 15 mmol/L    Glucose 76 65 - 100 mg/dL    BUN 6 6 - 20 mg/dL    Creatinine 0.68 0.55 - 1.02 mg/dL    BUN/Creatinine ratio 9 (L) 12 - 20      eGFR >60 >60 ml/min/1.73m2    Calcium 8.9 8.5 - 10.1 mg/dL    Bilirubin, total 0.6 0.2 - 1.0 mg/dL    AST (SGOT) 11 (L) 15 - 37 U/L    ALT (SGPT) 19 12 - 78 U/L    Alk.  phosphatase 40 (L) 45 - 117 U/L    Protein, total 6.7 6.4 - 8.2 g/dL    Albumin 3.9 3.5 - 5.0 g/dL    Globulin 2.8 2.0 - 4.0 g/dL    A-G Ratio 1.4 1.1 - 2.2     TROPONIN-HIGH SENSITIVITY    Collection Time: 11/13/22 12:17 PM   Result Value Ref Range    Troponin-High Sensitivity 5 0 - 51 ng/L   TROPONIN-HIGH SENSITIVITY    Collection Time: 11/13/22  2:21 PM   Result Value Ref Range    Troponin-High Sensitivity 6 0 - 51 ng/L       Radiologic Studies -   @[email or web address]@  CT Results  (Last 48 hours)      None          CXR Results  (Last 48 hours)      None            Medical Decision Making and ED Course   Differential Diagnosis & Medical Decision Making Provider Note:   58-year-old female presenting after episode of chest heaviness, shortness of breath, lightheadedness about 1 hour prior to arrival.  Presentation does seem most concerning for anxiety attack however regardless cardiac work-up was initiated which was unremarkable including negative troponin x2. Patient had mild sinus bradycardia but I do not believe this is new for her and she remained normotensive and asymptomatic in the emergency department. Results were discussed with patient. She was advised to follow-up with her primary care doctor and given cardiology contact forfollow up      - I am the first provider for this patient. I reviewed the vital signs, available nursing notes, past medical history, past surgical history, family history and social history. The patients presenting problems have been discussed, and they are in agreement with the care plan formulated and outlined with them. I have encouraged them to ask questions as they arise throughout their visit. Vital Signs-Reviewed the patient's vital signs. Patient Vitals for the past 12 hrs:   Temp Pulse Resp BP SpO2   11/13/22 1530 -- (!) 47 15 120/78 100 %   11/13/22 1309 -- (!) 51 16 -- 100 %   11/13/22 1204 98.8 °F (37.1 °C) (!) 48 12 (!) 148/91 100 %       ED Course:   ED Course as of 11/13/22 1720   Sun Nov 13, 2022   1450 Sinus oniel, rate 47, normal axis, normal intervals, no ST elevation/depression, no TWI, QTc 380   [KK]      ED Course User Index  [KK] Johnnie Martell MD            Disposition   Disposition: DC- Adult Discharges: All of the diagnostic tests were reviewed and questions answered. Diagnosis, care plan and treatment options were discussed. The patient understands the instructions and will follow up as directed. The patients results have been reviewed with them. They have been counseled regarding their diagnosis.   The patient verbally convey understanding and agreement of the signs, symptoms, diagnosis, treatment and prognosis and additionally agrees to follow up as recommended with their PCP in 24 - 48 hours. They also agree with the care-plan and convey that all of their questions have been answered. I have also put together some discharge instructions for them that include: 1) educational information regarding their diagnosis, 2) how to care for their diagnosis at home, as well a 3) list of reasons why they would want to return to the ED prior to their follow-up appointment, should their condition change. DISCHARGE PLAN:  1. Current Discharge Medication List        CONTINUE these medications which have NOT CHANGED    Details   ondansetron (ZOFRAN ODT) 4 mg disintegrating tablet Take 1 Tablet by mouth every eight (8) hours as needed for Nausea or Vomiting. Qty: 7 Tablet, Refills: 0           2. Follow-up Information       Follow up With Specialties Details Why Contact Info    Rigo Palumbo MD Internal Medicine Physician  for cardiology follow up [facility] [address] [facility]  [phone]            3. Return to ED if worse   4. Discharge Medication List as of 11/13/2022  3:33 PM             Diagnosis/Clinical Impression     Clinical Impression:   1. Chest pain, unspecified type    2. Palpitations        Attestations: Chela Marie MD, am the primary clinician of record. Please note that this dictation was completed with indico, the computer voice recognition software. Quite often unanticipated grammatical, syntax, homophones, and other interpretive errors are inadvertently transcribed by the computer software. Please disregard these errors. Please excuse any errors that have escaped final proofreading. Thank you.

## 2022-11-14 LAB
ATRIAL RATE: 47 BPM
CALCULATED P AXIS, ECG09: 38 DEGREES
CALCULATED R AXIS, ECG10: 73 DEGREES
CALCULATED T AXIS, ECG11: 28 DEGREES
DIAGNOSIS, 93000: NORMAL
P-R INTERVAL, ECG05: 154 MS
Q-T INTERVAL, ECG07: 430 MS
QRS DURATION, ECG06: 70 MS
QTC CALCULATION (BEZET), ECG08: 380 MS
VENTRICULAR RATE, ECG03: 47 BPM